# Patient Record
Sex: MALE | Race: WHITE | NOT HISPANIC OR LATINO
[De-identification: names, ages, dates, MRNs, and addresses within clinical notes are randomized per-mention and may not be internally consistent; named-entity substitution may affect disease eponyms.]

---

## 2019-04-25 PROBLEM — Z00.00 ENCOUNTER FOR PREVENTIVE HEALTH EXAMINATION: Status: ACTIVE | Noted: 2019-04-25

## 2019-05-15 ENCOUNTER — APPOINTMENT (OUTPATIENT)
Dept: ORTHOPEDIC SURGERY | Facility: CLINIC | Age: 74
End: 2019-05-15
Payer: MEDICARE

## 2019-05-15 VITALS — HEIGHT: 60 IN | BODY MASS INDEX: 29.45 KG/M2 | RESPIRATION RATE: 16 BRPM | WEIGHT: 150 LBS

## 2019-05-15 DIAGNOSIS — Z87.39 PERSONAL HISTORY OF OTHER DISEASES OF THE MUSCULOSKELETAL SYSTEM AND CONNECTIVE TISSUE: ICD-10-CM

## 2019-05-15 DIAGNOSIS — Z80.9 FAMILY HISTORY OF MALIGNANT NEOPLASM, UNSPECIFIED: ICD-10-CM

## 2019-05-15 DIAGNOSIS — M17.0 BILATERAL PRIMARY OSTEOARTHRITIS OF KNEE: ICD-10-CM

## 2019-05-15 PROCEDURE — 99203 OFFICE O/P NEW LOW 30 MIN: CPT

## 2019-05-15 RX ORDER — OLMESARTAN MEDOXOMIL 40 MG/1
TABLET, FILM COATED ORAL
Refills: 0 | Status: ACTIVE | COMMUNITY

## 2021-08-05 ENCOUNTER — NON-APPOINTMENT (OUTPATIENT)
Age: 76
End: 2021-08-05

## 2021-08-05 DIAGNOSIS — Z78.9 OTHER SPECIFIED HEALTH STATUS: ICD-10-CM

## 2021-08-05 DIAGNOSIS — Z80.1 FAMILY HISTORY OF MALIGNANT NEOPLASM OF TRACHEA, BRONCHUS AND LUNG: ICD-10-CM

## 2021-08-05 DIAGNOSIS — I10 ESSENTIAL (PRIMARY) HYPERTENSION: ICD-10-CM

## 2021-08-05 DIAGNOSIS — Z80.3 FAMILY HISTORY OF MALIGNANT NEOPLASM OF BREAST: ICD-10-CM

## 2021-08-10 ENCOUNTER — APPOINTMENT (OUTPATIENT)
Dept: GASTROENTEROLOGY | Facility: CLINIC | Age: 76
End: 2021-08-10
Payer: MEDICARE

## 2021-08-10 VITALS
BODY MASS INDEX: 28.86 KG/M2 | SYSTOLIC BLOOD PRESSURE: 118 MMHG | HEIGHT: 60 IN | DIASTOLIC BLOOD PRESSURE: 68 MMHG | TEMPERATURE: 97.8 F | OXYGEN SATURATION: 98 % | WEIGHT: 147 LBS

## 2021-08-10 PROCEDURE — 99204 OFFICE O/P NEW MOD 45 MIN: CPT

## 2021-08-10 NOTE — ASSESSMENT
[FreeTextEntry1] : \par \par \par 1. Diverticulosis:  well - controlled.  No pain,  infection,  bleeding\par Recommendations\par * Discussed and reviewed the potential complications of perforation,  pain,  infection,  bleeding \par * High - Fiber Diet was reviewed and emphasized\par * Daily fluid intake was reviewed : 6  --  8 cups of decaffeinated fluid daily was emphasized \par \par \par \par \par \par 2. Hemorrhoids:  well - controlled.  No pain,  swelling,  itch,  bleeding\par * Discussed the potential complications of thrombosis,  pain,  infection,  swelling, itching,  bleeding \par Recommendations: \par * High - Fiber Diet was reviewed and emphasized\par * 6  --  8 cups of decaffeinated fluid daily was emphasized \par * Sitz Bathes BID,  No:  Anusol HC  Suppos / Cream  LA BID -- was needed\par * No:  Tucks BID,  Balneol Lotion,   Calmoseptine Oint -- was needed ;    can use  Prep H prn\par * No:  need for  Colorectal surgical evaluation for possible ablation w Dr --  was emphasized\par \par \par \par \par \par 3. .History of Colon Polyps\par     see colon neoplasia screening\par \par \par \par \par \par \par 4. Colorectal Neoplasia  Screening:  to be evaluated\par   Utilizing teaching posters and anatomical models the following were discussed and emphasized with the patient in detail: \par * Discussed the pre-malignant potential of polyps\par * Discussed the importance of f/u surveillance / screening colonoscopy\par * High-Fiber Diet was reviewed and emphasized\par * Anti-oxidants and ASA/NSAID Therapy reviewed\par * Given age > 40-49 yo  &  +PH Colon Polyps & + FH Breast Ca \par * Recommend Colonoscopy  R/O  Colonic  Neoplasia-- in  2021 \par \par \par \par \par \par \par \par Informed Consent:\par * The risks & Benefits of  Colonoscopy were discussed w patient.\par * This included but was not limited to perforation, bleeding, sedation /med rxns possibly requiring surgery, blood transfusions, antibiotics & CPR/Intubation.\par * Pt. understands & agrees to the procedures.\par The following instructions in regards to the prep and medically essential ( cardiac, pulmonary, sz, psych, endocrine)  pre-op medication administration\par was reviewed and emphasized with the patient . \par * Pt. advised to D/C  ASA/NSAIDs  7  Days   PTP.\par * [ +++ ]  Dulcolax / Miralax / Mag. Citrate ,  [     ] Prepopik/ Clenpiq ,  [     ] Osmo Prep,  [    ] GoLytely,  prep. reviewed w Pt.\par * Hold  [           ] AM of procedure.\par * Hold  [           ] PM of procedure.\par * Take  [           ] PM of procedure.\par * Take  [   benicar         ] AM of procedure.\par \par

## 2021-08-10 NOTE — REVIEW OF SYSTEMS
[Red Eyes] : eyes not red [Dysuria] : no dysuria [Skin Lesions] : no skin lesions [Confused] : no confusion [Suicidal] : not suicidal [Easy Bruising] : no tendency for easy bruising

## 2021-08-10 NOTE — HISTORY OF PRESENT ILLNESS
[de-identified] : \par This HPI reflects a summary and review of records : including previous and most recent  Labs, body imaging, consults and progress notes, operative and pathology reports, EKG reports, ED records, found in [employer], [employer],  [employer] and any additional records brought in by  the patient at the time of the visit.\par \par \par \par PCP: Cho \par \par 77 yo M w h/o HTN, OA, s/p R. Nephrectomy for renal mass--which turned out to be benign\par Colon Polyps, Diverticulosis, Hemorrhoids\par \par Today:  Feeling well, no c/o , CP, SOB/ JAY, Cough, Wheeze, Palpitations, edema\par \par \par reviewed endoscopic findings and pathology in detail with patient: Colonoscopy: 0.75cm sessile asc colon polyp: adenoma, Mod . R & L sided Diverticulosis,  2nd deg int Hemorrhoids\par all of the patients questions were addressed and answered\par \par \par * Abd pain-no\par * Nausea-no\par * Vomit-no\par * Early satiety--no\par * Belching-no\par * Hiccups--no\par * Regurgitation-no\par * Acid Taste / Water Brash-no\par * Ht burn-no\par * Throat Clearing-no\par * Post-Nasal Drip-no\par * Congestion-no\par * Globus-no\par * Cough-no\par * Wheeze / PC--no\par * Hoarseness-no\par * Dysphagia-no\par * BMs: # 4 qd\par * Constipation-no\par * Diarrhea-no\par * Bloating-no\par * Strain on Defecation--no\par * Incompl Evac--no\par * Flatulence-no\par * Gurgling-no\par * Melena-no\par * BPBPR-no\par * Anorexia-no\par * Wt. Loss-no\par \par

## 2021-09-21 ENCOUNTER — RESULT REVIEW (OUTPATIENT)
Age: 76
End: 2021-09-21

## 2021-09-23 ENCOUNTER — RESULT REVIEW (OUTPATIENT)
Age: 76
End: 2021-09-23

## 2021-09-24 ENCOUNTER — APPOINTMENT (OUTPATIENT)
Dept: GASTROENTEROLOGY | Facility: HOSPITAL | Age: 76
End: 2021-09-24

## 2023-01-26 ENCOUNTER — NON-APPOINTMENT (OUTPATIENT)
Age: 78
End: 2023-01-26

## 2023-03-13 ENCOUNTER — APPOINTMENT (OUTPATIENT)
Dept: GASTROENTEROLOGY | Facility: CLINIC | Age: 78
End: 2023-03-13
Payer: MEDICARE

## 2023-03-13 DIAGNOSIS — A09 INFECTIOUS GASTROENTERITIS AND COLITIS, UNSPECIFIED: ICD-10-CM

## 2023-03-13 PROCEDURE — 99215 OFFICE O/P EST HI 40 MIN: CPT

## 2023-03-13 RX ORDER — EMPAGLIFLOZIN 25 MG/1
25 TABLET, FILM COATED ORAL
Refills: 0 | Status: ACTIVE | COMMUNITY

## 2023-03-13 NOTE — ASSESSMENT
[FreeTextEntry1] : \par Dairrhea--> after returning form [facility]\par  C/S--> Ecoli--s/p RX\par   Improved\par P:\par Recommend: \par * Diet: moderate -  Low Fat & Lactose free, Low FODMAPs--was reviewed & emphasized\par * Daily fluid intake was reviewed : 6  --  8 cups of decaffeinated fluid daily was emphasized\par * Regular aerobic exercise was emphasized\par * Probiotics  Diana stor  1  daily\par \par \par \par \par \par 1. Diverticulosis:  well - controlled.  No pain,  infection,  bleeding\par Recommendations\par * Discussed and reviewed the potential complications of perforation,  pain,  infection,  bleeding \par * moderate  - Fiber Diet was reviewed and emphasized\par * Daily fluid intake was reviewed : 6  --  8 cups of decaffeinated fluid daily was emphasized \par \par \par \par \par \par 2. Hemorrhoids:  well - controlled.  No pain,  swelling,  itch,  bleeding\par * Discussed  previously  the potential complications of thrombosis,  pain,  infection,  swelling, itching,  bleeding \par Recommendations: \par * moderate - Fiber Diet was reviewed and emphasized\par * 6  --  8 cups of decaffeinated fluid daily was emphasized \par * Sitz Bathes BID,  No:  Anusol HC  Suppos / Cream  ME BID -- was needed\par * No:  Tucks BID,  Balneol Lotion,   Calmoseptine Oint -- was needed ;    can use  Prep H prn\par * No:  need for  Colorectal surgical evaluation for possible ablation w  --  was emphasized\par \par \par \par \par \par 3. .History of Colon Polyps\par     see colon neoplasia screening\par \par \par \par \par \par \par 4. Colorectal Neoplasia  Screening:  \par   Utilizing teaching posters and anatomical models the following were discussed and emphasized with the patient in detail: \par * Discussed the pre-malignant potential of polyps\par * Discussed the importance of f/u surveillance / screening colonoscopy\par * High-Fiber Diet was reviewed and emphasized\par * Anti-oxidants and ASA/NSAID Therapy reviewed\par * Given age > 40-49 yo  &  +PH Colon Polyps & + FH Breast Ca \par * Recommend Colonoscopy to R/O  Colonic  Neoplasia-- in  2024\par \par \par \par \par \par \par \par \par \par

## 2023-03-13 NOTE — HISTORY OF PRESENT ILLNESS
[de-identified] : \par This HPI reflects a summary and review of records : including previous and most recent  Labs, body imaging, consults and progress notes, operative and pathology reports, EKG reports, ED records, found in [employer], [employer],  [employer] and any additional records brought in by  the patient at the time of the visit.\par \par \par \par PCP: Cho \par \par 77 yo M w h/o HTN, OA, s/p R. Nephrectomy for renal mass--which turned out to be benign\par Colon Polyps, Diverticulosis, Hemorrhoids\par \par 9/24/21  Colonoscopy:, Mild R & moderate L sided Diverticulosis,  \par  0.5cm sessile TV  & 1cm sigmoid colon polyps: adenomas; 2nd deg int Hemorrhoids\par \par 3/13/23    Today:  Feeling well, no c/o , CP, SOB/ JAY, Cough, Wheeze, Palpitations, edema\par \par reviewed endoscopic findings and pathology in detail with patient:  9/2021  Colonoscopy: \par all of the patients questions were addressed and answered\par \par 3/13/23   Today:  pt had 2 episodes of diarhea after return form [facility]\par                            one in 11/2022, PC watery, no blood or mucous, no pain\par                            resolved\par                            Then again on 2/2022, again watery, no pain, mucous or blood\par                            Saw PCP--> bassam w E.coli--> rx w ABX x 3 days\par                             Gradually resolved, Now BMs: # 5, 3x/D,  not nocturnal\par \par \par * Abd pain-->no\par * Nausea--> no\par * Vomit--> no\par * Early satiety--> no\par * Belching--> no\par * Hiccups--> no\par * Regurgitation--> no\par * Acid Taste / Water Brash--> no\par * Ht burn--> no\par * Dysphagia--> no\par * Throat Clearing--> no\par * Hoarseness--> no\par * Post-Nasal Drip--> no\par * Congestion--> no\par * Globus--> no\par * Cough--> no\par * Wheeze / PC-> -no\par * BMs: # 4 qd\par * Constipation--> no\par * Diarrhea--> better\par * Bloating--> no\par * Strain on Defecation--> no\par * Incompl Evac--> no\par * Flatulence--> no\par * Gurgling--> no\par * Melena--> no\par * BPBPR-> -no\par * Anorexia--> no\par * Wt. Loss--> no\par \par \par \par

## 2024-05-27 ENCOUNTER — NON-APPOINTMENT (OUTPATIENT)
Age: 79
End: 2024-05-27

## 2024-05-27 NOTE — HISTORY OF PRESENT ILLNESS
[de-identified] : This HPI reflects a summary and review of records : including previous and most recent  Labs, body imaging, consults and progress notes, operative and pathology reports, EKG reports, ED records, found in [employer], [employer],  [employer] and any additional records brought in by  the patient at the time of the visit.    PCP: Cali   80 yo M w h/o HTN, OA, s/p R. Nephrectomy for renal mass--which turned out to be benign Colon Polyps, Diverticulosis, Hemorrhoids  9/24/21  Colonoscopy:, Mild R & moderate L sided Diverticulosis,    0.5cm sessile TV  & 1cm sigmoid colon polyps: adenomas; 2nd deg int Hemorrhoids  3/13/23:   pt had 2 episodes of diarhea after return form [facility]                 one in 11/2022, PC watery, no blood or mucous, no pain--> resolved                 Then again on 2/2023, again watery, no pain, mucous or blood                 Saw PCP--> bassam w E.coli--> rx w ABX x 3 days                 Gradually resolved, Now BMs: # 5, 3x/D,  not nocturnal   7/1/34 Today:  Feeling well, no c/o , CP, SOB/ JAY, Cough, Wheeze, Palpitations, edema  reviewed endoscopic findings and pathology in detail with patient:  9/2021  Colonoscopy:  all of the patients questions were addressed and answered  7/1/24  * Abd pain-->no * Nausea--> no * Vomit--> no * Early satiety--> no * Belching--> no * Hiccups--> no * Regurgitation--> no * Acid Taste / Water Brash--> no * Ht burn--> no * Dysphagia--> no * Throat Clearing--> no * Hoarseness--> no * Post-Nasal Drip--> no * Congestion--> no * Globus--> no * Cough--> no * Wheeze / PC-> -no * BMs: # 4 qd * Constipation--> no * Diarrhea--> No  * Bloating--> no * Strain on Defecation--> no * Incompl Evac--> no * Flatulence--> no * Gurgling--> no * Melena--> no * BPBPR-> -no * Anorexia--> no * Wt. Loss--> no

## 2024-05-27 NOTE — REASON FOR VISIT
[Follow-Up: _____] : a [unfilled] follow-up visit [FreeTextEntry1] :  referred by Dr. Leiva  for GI Evaluation

## 2024-05-27 NOTE — ASSESSMENT
[FreeTextEntry1] :    1. Diverticulosis:  well - controlled.  No pain,  infection,  bleeding Recommendations * Discussed and reviewed the potential complications of perforation,  pain,  infection,  bleeding  * moderate  - Fiber Diet was emphasized * Daily fluid intake was reviewed : 6  --  8 cups of decaffeinated fluid daily was emphasized       2. Hemorrhoids:  well - controlled.  No pain,  swelling,  itch,  bleeding * Discussed  previously  the potential complications of thrombosis,  pain,  infection,  swelling, itching,  bleeding  Recommendations:  * moderate - Fiber Diet was  emphasized * 6  --  8 cups of decaffeinated fluid daily was emphasized  * Sitz Bathes BID,  No:  Anusol HC  Suppos / Cream  NV BID -- was needed * No:  Tucks BID,  Balneol Lotion,   Calmoseptine Oint -- was needed ;    can use  Prep H prn * No:  need for  Colorectal surgical evaluation for possible ablation       3. Colorectal Neoplasia  Screening:     Utilizing teaching posters and anatomical models the following were discussed and emphasized with the patient in detail:  * Discussed the pre-malignant potential of polyps * Discussed the importance of f/u surveillance / screening colonoscopy * High-Fiber Diet was reviewed and emphasized * Anti-oxidants and ASA/NSAID Therapy reviewed * Given age > 40-51 yo  &  +PH Colon Polyps & + FH Breast Ca  * Recommend Colonoscopy to R/O  Colonic  Neoplasia-- in  2024       Informed Consent: * The risks & Benefits of Colonoscopy were discussed w patient. * This included but was not limited to perforation, bleeding, sedation /med rxns, missed lesions possibly requiring surgery, blood transfusions, antibiotics & CPR/Intubation. * Pt. understands & agrees to the procedures. The following instructions in regards to the prep and medically essential ( cardiac, pulmonary, sz, psych, endocrine)  pre-op medication administration was reviewed and emphasized with the patient .  * Pt. advised to D/C  ASA/NSAIDs  7  Days  &  Plavix, Warfarin,  4  -  5  Days  PTP. * [ +++ ]  Dulcolax / Miralax / Mag. Citrate ,  [     ] Prepopik/ Clenpiq ,  [     ] Osmo Prep,  [    ] GoLytely,  prep. reviewed w Pt. * Hold  [           ] AM of procedure. * Hold  [           ] PM  before procedure. * Take  [           ] PM  before procedure. * Take  [           ] AM of procedure.

## 2024-05-27 NOTE — PHYSICAL EXAM
[General Appearance - Alert] : alert [General Appearance - In No Acute Distress] : in no acute distress [Sclera] : the sclera and conjunctiva were normal [] : no respiratory distress [Auscultation Breath Sounds / Voice Sounds] : lungs were clear to auscultation bilaterally [Edema] : there was no peripheral edema [Flat] : flat [Normal] : normal [Soft, Nontender] : the abdomen was soft and nontender [No Mass] : no masses were palpated [No HSM] : no hepatosplenomegaly noted [Skin Color & Pigmentation] : normal skin color and pigmentation [Oriented To Time, Place, And Person] : oriented to person, place, and time [Impaired Insight] : insight and judgment were intact

## 2024-07-01 ENCOUNTER — APPOINTMENT (OUTPATIENT)
Dept: GASTROENTEROLOGY | Facility: CLINIC | Age: 79
End: 2024-07-01
Payer: MEDICARE

## 2024-07-01 DIAGNOSIS — Z86.010 PERSONAL HISTORY OF COLONIC POLYPS: ICD-10-CM

## 2024-07-01 DIAGNOSIS — K64.8 OTHER HEMORRHOIDS: ICD-10-CM

## 2024-07-01 DIAGNOSIS — K57.30 DIVERTICULOSIS OF LARGE INTESTINE W/OUT PERFORATION OR ABSCESS W/OUT BLEEDING: ICD-10-CM

## 2024-07-01 DIAGNOSIS — Z12.11 ENCOUNTER FOR SCREENING FOR MALIGNANT NEOPLASM OF COLON: ICD-10-CM

## 2024-07-01 PROCEDURE — 99214 OFFICE O/P EST MOD 30 MIN: CPT

## 2024-10-09 ENCOUNTER — RESULT REVIEW (OUTPATIENT)
Age: 79
End: 2024-10-09

## 2024-10-09 ENCOUNTER — APPOINTMENT (OUTPATIENT)
Dept: GASTROENTEROLOGY | Facility: HOSPITAL | Age: 79
End: 2024-10-09